# Patient Record
Sex: FEMALE | Race: BLACK OR AFRICAN AMERICAN | NOT HISPANIC OR LATINO | URBAN - METROPOLITAN AREA
[De-identification: names, ages, dates, MRNs, and addresses within clinical notes are randomized per-mention and may not be internally consistent; named-entity substitution may affect disease eponyms.]

---

## 2018-12-29 ENCOUNTER — HOSPITAL ENCOUNTER (EMERGENCY)
Facility: HOSPITAL | Age: 63
Discharge: HOME | End: 2018-12-29
Attending: EMERGENCY MEDICINE
Payer: MEDICARE

## 2018-12-29 VITALS
DIASTOLIC BLOOD PRESSURE: 72 MMHG | TEMPERATURE: 98 F | RESPIRATION RATE: 16 BRPM | OXYGEN SATURATION: 98 % | WEIGHT: 175 LBS | HEART RATE: 84 BPM | HEIGHT: 64 IN | SYSTOLIC BLOOD PRESSURE: 130 MMHG | BODY MASS INDEX: 29.88 KG/M2

## 2018-12-29 DIAGNOSIS — M25.512 PAIN OF BOTH SHOULDER JOINTS: Primary | ICD-10-CM

## 2018-12-29 DIAGNOSIS — M25.511 PAIN OF BOTH SHOULDER JOINTS: Primary | ICD-10-CM

## 2018-12-29 LAB
BILIRUB UR QL STRIP: NEGATIVE
CLARITY UR: CLEAR
COLOR UR: YELLOW
GLUCOSE UR QL STRIP: NEGATIVE
HGB UR QL STRIP: ABNORMAL
KETONES UR QL STRIP: NEGATIVE
LEUKOCYTE ESTERASE UR QL STRIP: NEGATIVE
NITRITE UR QL STRIP: NEGATIVE
PH UR STRIP: 6 [PH] (ref 5–8)
PROT UR QL STRIP: NEGATIVE
SP GR UR STRIP: 1.02 (ref 1–1.03)
URN SPEC COLLECT METH UR: ABNORMAL
UROBILINOGEN UR STRIP-ACNC: NEGATIVE EU/DL

## 2018-12-29 PROCEDURE — 81003 URINALYSIS AUTO W/O SCOPE: CPT

## 2018-12-29 PROCEDURE — 99283 EMERGENCY DEPT VISIT LOW MDM: CPT

## 2018-12-29 RX ORDER — NAPROXEN 500 MG/1
500 TABLET ORAL 2 TIMES DAILY PRN
Qty: 30 TABLET | Refills: 0 | Status: SHIPPED | OUTPATIENT
Start: 2018-12-29

## 2018-12-29 NOTE — ED PROVIDER NOTES
"NAME:  Cathryn Cordero  MRN:    [id number]  ADMIT DATE: 12/29/2018        eMERGENCY dEPARTMENT eNCOUnter    CHIEF COMPLAINT    Chief Complaint   Patient presents with    Shoulder Pain     Patient presents to the ED with reports of having bilateral shoulder/arm pain with back pain that started x 2 weeks ago. Denies any fall or trauma. Treated at home with "tylenol three, eight hundred milligram iburprofen, and aleve" with no relief.     Back Pain       HPI    This is a 63 y.o. female who has no past medical history on file.   The patient presents to the Emergency Department with bilateral shoulder pain x2 weeks.  Patient denies any injury. Pain is constant, achy/throbbing, radiating down both arms, not worsening or improving.    Symptoms are associated with decreased range of motion of both shoulders.  Pt denies numbness/tingling, weakness..   Symptoms are aggravated by movement of the shoulders.  Symptoms are relieved by nothing.  Patient has taken pain medicine at home already.  Patient has no prior history of similar symptoms.           ALLERGIES    Review of patient's allergies indicates:  No Known Allergies      PAST MEDICAL HISTORY  No past medical history on file.      SURGICAL HISTORY    No past surgical history on file.      SOCIAL HISTORY    Social History     Socioeconomic History    Marital status:      Spouse name: Not on file    Number of children: Not on file    Years of education: Not on file    Highest education level: Not on file   Social Needs    Financial resource strain: Not on file    Food insecurity - worry: Not on file    Food insecurity - inability: Not on file    Transportation needs - medical: Not on file    Transportation needs - non-medical: Not on file   Occupational History    Not on file   Tobacco Use    Smoking status: Not on file   Substance and Sexual Activity    Alcohol use: Not on file    Drug use: Not on file    Sexual activity: Not on file   Other Topics " "Concern    Not on file   Social History Narrative    Not on file         FAMILY HISTORY    No family history on file.      REVIEW OF SYSTEMS   ROS  All Systems otherwise negative except as noted in the History of Present Illness.        PHYSICAL EXAM    Reviewed Triage Note    VITAL SIGNS:   ED Triage Vitals [12/29/18 0629]   Enc Vitals Group      /72      Pulse 84      Resp 16      Temp 97.7 °F (36.5 °C)      Temp src Oral      SpO2 98 %      Weight 175 lb      Height 5' 4"      Head Circumference       Peak Flow       Pain Score       Pain Loc       Pain Edu?       Excl. in GC?        Patient Vitals for the past 24 hrs:   BP Temp Temp src Pulse Resp SpO2 Height Weight   12/29/18 0629 130/72 97.7 °F (36.5 °C) Oral 84 16 98 % 5' 4" (1.626 m) 79.4 kg (175 lb)         Physical Exam  Constitutional:  Well-developed, well-nourished.  Patient sitting comfortably in chair.  HENT:  Normocephalic, atraumatic.  Eyes:  Conjunctiva normal without discharge.   Neck: Normal range of motion. No midline tenderness or vertebral step-off. No stridor. No meningismus. No lymphadenopathy.   Respiratory:  Normal breath sounds bilaterally.  No respiratory distress or conversational dyspnea.  Cardiovascular:  Normal heart rate. Normal rhythm. No pitting lower extremity edema.   Musculoskeletal:  No gross deformity.  No palpable bony deformity.  Patient has limitation to 90° of ABduction and flexion of bilateral shoulders.  Normal strength to biceps/triceps, forearm and hands bilaterally. Patient has tenderness to bilateral trapezius, left > right.  Integument:  Warm and dry. No rash.  Neurologic:  Normal motor function. Normal sensory function. No focal deficits noted. Alert and Interactive.  Psychiatric:  Affect normal. Mood normal.         LABS  Pertinent labs reviewed. (See chart for details)   Labs Reviewed   URINALYSIS - Abnormal; Notable for the following components:       Result Value    Occult Blood UA Trace (*)     All " other components within normal limits         RADIOLOGY    Imaging Results    None             PROCEDURES    Procedures        ED COURSE & MEDICAL DECISION MAKING    This is an emergent evaluation of a 63 y.o.female patient with presentation of bilateral shoulder pain, subacute, associated with trapezial spasm with radiation down to elbows but not down to hands.   Initial differentials include but are not limited to:  Shoulder arthritis bilaterally, trapezial spasm, cervical radiculopathy.   Plan:  Naprosyn as needed for pain relief.  The patient has no weakness on exam, no history of trauma. Believe patient is stable discharge without any further imaging.    Clinical impression and plan discussed with patient.    Pt is to call for follow up with PCP in 7 days.  Pt to return to the ED for any new or concerning symptoms.  Aftercare instructions and return precautions provided to patient.   Pt expressed understanding and agrees with plan.               MEDS:  Medications - No data to display        IMPRESSION:  1. Pain of both shoulder joints             DISPOSITION  Patient discharge in stable condition.           DISCLAIMER: This note was prepared with [employer] voice recognition transcription software. Garbled syntax, mangled pronouns, and other bizarre constructions may be attributed to that software system.          Tony Gonzalez MD  12/29/18 1123

## 2018-12-29 NOTE — ED NOTES
"Patient presents to ED secondary to bilateral shoulder pain radiating down bilateral arms stopping at elbows. State has limited ROM and has to have manual help to lift arm. Palpable pulses. Patient also c/o lower back pain. Denies urinary symptoms. States has been having pain x2 weeks. Denies fall or trauma. States "I took tylenol three, ibuprofen, regular tylenol and it didn't help. But I can't lie, my friend gave me a hydrocodone and it took all my pain away." Patient sent to bathroom to get urine specimen. NAD noted.       "

## 2018-12-29 NOTE — DISCHARGE INSTRUCTIONS
You likely have arthritis in your shoulders.  Another possibility is tension in the neck muscles causing a nerve inflammation.  Take medications as needed for pain.    Thank you for choosing [facility]! We appreciate you coming to us for your medical care. We hope you feel better soon! Please come back to [facility] for all of your future medical needs.      Sincerely,    Tony Gonzalez MD  Medical Director  Emergency Department

## 2018-12-29 NOTE — ED NOTES
Assumed care of this patient.     Pt discharged with prescription for Naprosyn at this time. No distress noted.

## 2020-01-07 ENCOUNTER — HOSPITAL ENCOUNTER (OUTPATIENT)
Dept: HOSPITAL 92 - BICMRI | Age: 65
Discharge: HOME | End: 2020-01-07
Attending: ORTHOPAEDIC SURGERY
Payer: MEDICARE

## 2020-01-07 DIAGNOSIS — M75.102: Primary | ICD-10-CM

## 2020-01-07 DIAGNOSIS — M65.812: ICD-10-CM

## 2020-02-04 ENCOUNTER — HOSPITAL ENCOUNTER (OUTPATIENT)
Dept: HOSPITAL 92 - LABBT | Age: 65
Discharge: HOME | End: 2020-02-04
Attending: ORTHOPAEDIC SURGERY
Payer: MEDICARE

## 2020-02-04 DIAGNOSIS — M75.102: ICD-10-CM

## 2020-02-04 DIAGNOSIS — Z01.812: Primary | ICD-10-CM

## 2020-02-04 LAB
HGB BLD-MCNC: 13.8 G/DL (ref 12–16)
MCH RBC QN AUTO: 28.7 PG (ref 27–31)
MCV RBC AUTO: 88.3 FL (ref 78–98)
MDIFF COMPLETE?: YES
PLATELET # BLD AUTO: 183 THOU/UL (ref 130–400)
RBC # BLD AUTO: 4.8 MILL/UL (ref 4.2–5.4)
WBC # BLD AUTO: 7.5 THOU/UL (ref 4.8–10.8)

## 2020-02-04 PROCEDURE — 85025 COMPLETE CBC W/AUTO DIFF WBC: CPT

## 2020-02-06 ENCOUNTER — HOSPITAL ENCOUNTER (OUTPATIENT)
Dept: HOSPITAL 92 - SDC | Age: 65
Discharge: HOME | End: 2020-02-06
Attending: ORTHOPAEDIC SURGERY
Payer: MEDICARE

## 2020-02-06 VITALS — BODY MASS INDEX: 30.2 KG/M2

## 2020-02-06 DIAGNOSIS — F20.9: ICD-10-CM

## 2020-02-06 DIAGNOSIS — M75.122: Primary | ICD-10-CM

## 2020-02-06 DIAGNOSIS — M47.812: ICD-10-CM

## 2020-02-06 DIAGNOSIS — G89.18: ICD-10-CM

## 2020-02-06 DIAGNOSIS — Z79.899: ICD-10-CM

## 2020-02-06 DIAGNOSIS — F41.9: ICD-10-CM

## 2020-02-06 DIAGNOSIS — M75.22: ICD-10-CM

## 2020-02-06 DIAGNOSIS — M19.90: ICD-10-CM

## 2020-02-06 DIAGNOSIS — M19.012: ICD-10-CM

## 2020-02-06 DIAGNOSIS — F31.9: ICD-10-CM

## 2020-02-06 PROCEDURE — 29828 SHO ARTHRS SRG BICP TENODSIS: CPT

## 2020-02-06 PROCEDURE — 0RHK44Z INSERTION OF INTERNAL FIXATION DEVICE INTO LEFT SHOULDER JOINT, PERCUTANEOUS ENDOSCOPIC APPROACH: ICD-10-PCS | Performed by: ORTHOPAEDIC SURGERY

## 2020-02-06 PROCEDURE — 76705 ECHO EXAM OF ABDOMEN: CPT

## 2020-02-06 PROCEDURE — 64416 NJX AA&/STRD BRCH PL NFS IMG: CPT

## 2020-02-06 PROCEDURE — 0LS44ZZ REPOSITION LEFT UPPER ARM TENDON, PERCUTANEOUS ENDOSCOPIC APPROACH: ICD-10-PCS | Performed by: ORTHOPAEDIC SURGERY

## 2020-02-06 PROCEDURE — 0LM24ZZ REATTACHMENT OF LEFT SHOULDER TENDON, PERCUTANEOUS ENDOSCOPIC APPROACH: ICD-10-PCS | Performed by: ORTHOPAEDIC SURGERY

## 2020-02-06 PROCEDURE — 3E0T3BZ INTRODUCTION OF ANESTHETIC AGENT INTO PERIPHERAL NERVES AND PLEXI, PERCUTANEOUS APPROACH: ICD-10-PCS | Performed by: ORTHOPAEDIC SURGERY

## 2020-02-06 PROCEDURE — A4306 DRUG DELIVERY SYSTEM <=50 ML: HCPCS

## 2020-02-06 PROCEDURE — S0020 INJECTION, BUPIVICAINE HYDRO: HCPCS

## 2020-02-06 PROCEDURE — C1713 ANCHOR/SCREW BN/BN,TIS/BN: HCPCS

## 2020-02-06 PROCEDURE — 29827 SHO ARTHRS SRG RT8TR CUF RPR: CPT

## 2020-02-06 PROCEDURE — 97139 UNLISTED THERAPEUTIC PX: CPT

## 2020-06-04 ENCOUNTER — HOSPITAL ENCOUNTER (INPATIENT)
Dept: HOSPITAL 92 - ERS | Age: 65
LOS: 4 days | Discharge: HOME | DRG: 249 | End: 2020-06-08
Attending: INTERNAL MEDICINE | Admitting: INTERNAL MEDICINE
Payer: MEDICARE

## 2020-06-04 VITALS — BODY MASS INDEX: 29 KG/M2

## 2020-06-04 DIAGNOSIS — Z90.710: ICD-10-CM

## 2020-06-04 DIAGNOSIS — I10: ICD-10-CM

## 2020-06-04 DIAGNOSIS — F41.9: ICD-10-CM

## 2020-06-04 DIAGNOSIS — F31.9: ICD-10-CM

## 2020-06-04 DIAGNOSIS — B18.2: ICD-10-CM

## 2020-06-04 DIAGNOSIS — F14.90: ICD-10-CM

## 2020-06-04 DIAGNOSIS — F20.9: ICD-10-CM

## 2020-06-04 DIAGNOSIS — I21.09: Primary | ICD-10-CM

## 2020-06-04 LAB
ALBUMIN SERPL BCG-MCNC: 3.6 G/DL (ref 3.4–4.8)
ALP SERPL-CCNC: 129 U/L (ref 40–110)
ALT SERPL W P-5'-P-CCNC: 11 U/L (ref 8–55)
ANION GAP SERPL CALC-SCNC: 10 MMOL/L (ref 10–20)
AST SERPL-CCNC: 35 U/L (ref 5–34)
BILIRUB SERPL-MCNC: 0.3 MG/DL (ref 0.2–1.2)
BUN SERPL-MCNC: 9 MG/DL (ref 9.8–20.1)
CALCIUM SERPL-MCNC: 8.9 MG/DL (ref 7.8–10.44)
CHLORIDE SERPL-SCNC: 108 MMOL/L (ref 98–107)
CK MB SERPL-MCNC: 34.5 NG/ML (ref 0–6.6)
CK SERPL-CCNC: 445 U/L (ref 29–168)
CO2 SERPL-SCNC: 24 MMOL/L (ref 23–31)
CREAT CL PREDICTED SERPL C-G-VRATE: 0 ML/MIN (ref 70–130)
GLOBULIN SER CALC-MCNC: 3.5 G/DL (ref 2.4–3.5)
GLUCOSE SERPL-MCNC: 122 MG/DL (ref 80–115)
HGB BLD-MCNC: 13.1 G/DL (ref 12–16)
MCH RBC QN AUTO: 29.6 PG (ref 27–31)
MCV RBC AUTO: 88.4 FL (ref 78–98)
MDIFF COMPLETE?: YES
PLATELET # BLD AUTO: 143 THOU/UL (ref 130–400)
POTASSIUM SERPL-SCNC: 3.3 MMOL/L (ref 3.5–5.1)
RBC # BLD AUTO: 4.41 MILL/UL (ref 4.2–5.4)
SODIUM SERPL-SCNC: 139 MMOL/L (ref 136–145)
TROPONIN I SERPL DL<=0.01 NG/ML-MCNC: 3.57 NG/ML (ref ?–0.03)
TROPONIN I SERPL DL<=0.01 NG/ML-MCNC: 7.44 NG/ML (ref ?–0.03)
WBC # BLD AUTO: 6.3 THOU/UL (ref 4.8–10.8)

## 2020-06-04 PROCEDURE — 99152 MOD SED SAME PHYS/QHP 5/>YRS: CPT

## 2020-06-04 PROCEDURE — 80053 COMPREHEN METABOLIC PANEL: CPT

## 2020-06-04 PROCEDURE — 71045 X-RAY EXAM CHEST 1 VIEW: CPT

## 2020-06-04 PROCEDURE — C1876 STENT, NON-COA/NON-COV W/DEL: HCPCS

## 2020-06-04 PROCEDURE — 80061 LIPID PANEL: CPT

## 2020-06-04 PROCEDURE — 82550 ASSAY OF CK (CPK): CPT

## 2020-06-04 PROCEDURE — 93306 TTE W/DOPPLER COMPLETE: CPT

## 2020-06-04 PROCEDURE — 93458 L HRT ARTERY/VENTRICLE ANGIO: CPT

## 2020-06-04 PROCEDURE — 85347 COAGULATION TIME ACTIVATED: CPT

## 2020-06-04 PROCEDURE — 85025 COMPLETE CBC W/AUTO DIFF WBC: CPT

## 2020-06-04 PROCEDURE — B2111ZZ FLUOROSCOPY OF MULTIPLE CORONARY ARTERIES USING LOW OSMOLAR CONTRAST: ICD-10-PCS | Performed by: INTERNAL MEDICINE

## 2020-06-04 PROCEDURE — 96374 THER/PROPH/DIAG INJ IV PUSH: CPT

## 2020-06-04 PROCEDURE — 93005 ELECTROCARDIOGRAM TRACING: CPT

## 2020-06-04 PROCEDURE — 84484 ASSAY OF TROPONIN QUANT: CPT

## 2020-06-04 PROCEDURE — 99153 MOD SED SAME PHYS/QHP EA: CPT

## 2020-06-04 PROCEDURE — 36415 COLL VENOUS BLD VENIPUNCTURE: CPT

## 2020-06-04 PROCEDURE — 92941 PRQ TRLML REVSC TOT OCCL AMI: CPT

## 2020-06-04 PROCEDURE — 4A023N7 MEASUREMENT OF CARDIAC SAMPLING AND PRESSURE, LEFT HEART, PERCUTANEOUS APPROACH: ICD-10-PCS | Performed by: INTERNAL MEDICINE

## 2020-06-04 PROCEDURE — B2151ZZ FLUOROSCOPY OF LEFT HEART USING LOW OSMOLAR CONTRAST: ICD-10-PCS | Performed by: INTERNAL MEDICINE

## 2020-06-04 PROCEDURE — 96375 TX/PRO/DX INJ NEW DRUG ADDON: CPT

## 2020-06-04 PROCEDURE — 82553 CREATINE MB FRACTION: CPT

## 2020-06-04 PROCEDURE — 93010 ELECTROCARDIOGRAM REPORT: CPT

## 2020-06-04 PROCEDURE — 93798 PHYS/QHP OP CAR RHAB W/ECG: CPT

## 2020-06-04 PROCEDURE — 02703DZ DILATION OF CORONARY ARTERY, ONE ARTERY WITH INTRALUMINAL DEVICE, PERCUTANEOUS APPROACH: ICD-10-PCS | Performed by: INTERNAL MEDICINE

## 2020-06-04 RX ADMIN — Medication SCH ML: at 21:20

## 2020-06-04 RX ADMIN — ASPIRIN 81 MG CHEWABLE TABLET SCH MG: 81 TABLET CHEWABLE at 09:42

## 2020-06-04 RX ADMIN — TICAGRELOR SCH MG: 90 TABLET ORAL at 09:42

## 2020-06-04 RX ADMIN — ACETAMINOPHEN AND CODEINE PHOSPHATE PRN TAB: 300; 30 TABLET ORAL at 12:20

## 2020-06-04 RX ADMIN — TICAGRELOR SCH MG: 90 TABLET ORAL at 21:20

## 2020-06-04 RX ADMIN — Medication SCH ML: at 09:43

## 2020-06-04 RX ADMIN — ACETAMINOPHEN AND CODEINE PHOSPHATE PRN TAB: 300; 30 TABLET ORAL at 21:19

## 2020-06-04 RX ADMIN — ACETAMINOPHEN AND CODEINE PHOSPHATE PRN TAB: 300; 30 TABLET ORAL at 16:08

## 2020-06-05 LAB
ALBUMIN SERPL BCG-MCNC: 3.1 G/DL (ref 3.4–4.8)
ALP SERPL-CCNC: 86 U/L (ref 40–110)
ALT SERPL W P-5'-P-CCNC: 11 U/L (ref 8–55)
ANION GAP SERPL CALC-SCNC: 11 MMOL/L (ref 10–20)
AST SERPL-CCNC: 32 U/L (ref 5–34)
BASOPHILS # BLD AUTO: 0.1 THOU/UL (ref 0–0.2)
BASOPHILS NFR BLD AUTO: 0.9 % (ref 0–1)
BILIRUB SERPL-MCNC: 0.4 MG/DL (ref 0.2–1.2)
BUN SERPL-MCNC: 8 MG/DL (ref 9.8–20.1)
CALCIUM SERPL-MCNC: 8.6 MG/DL (ref 7.8–10.44)
CHD RISK SERPL-RTO: 4.1 (ref ?–4.5)
CHLORIDE SERPL-SCNC: 109 MMOL/L (ref 98–107)
CHOLEST SERPL-MCNC: 163 MG/DL
CO2 SERPL-SCNC: 22 MMOL/L (ref 23–31)
CREAT CL PREDICTED SERPL C-G-VRATE: 99 ML/MIN (ref 70–130)
EOSINOPHIL # BLD AUTO: 0.1 THOU/UL (ref 0–0.7)
EOSINOPHIL NFR BLD AUTO: 1.3 % (ref 0–10)
GLOBULIN SER CALC-MCNC: 3.1 G/DL (ref 2.4–3.5)
GLUCOSE SERPL-MCNC: 94 MG/DL (ref 80–115)
HDLC SERPL-MCNC: 40 MG/DL
HGB BLD-MCNC: 12 G/DL (ref 12–16)
LDLC SERPL CALC-MCNC: 91 MG/DL
LYMPHOCYTES # BLD: 2.7 THOU/UL (ref 1.2–3.4)
LYMPHOCYTES NFR BLD AUTO: 43.4 % (ref 21–51)
MCH RBC QN AUTO: 30.1 PG (ref 27–31)
MCV RBC AUTO: 88.1 FL (ref 78–98)
MONOCYTES # BLD AUTO: 0.4 THOU/UL (ref 0.11–0.59)
MONOCYTES NFR BLD AUTO: 7 % (ref 0–10)
NEUTROPHILS # BLD AUTO: 3 THOU/UL (ref 1.4–6.5)
NEUTROPHILS NFR BLD AUTO: 47.4 % (ref 42–75)
PLATELET # BLD AUTO: 132 THOU/UL (ref 130–400)
POTASSIUM SERPL-SCNC: 3.7 MMOL/L (ref 3.5–5.1)
RBC # BLD AUTO: 3.99 MILL/UL (ref 4.2–5.4)
SODIUM SERPL-SCNC: 138 MMOL/L (ref 136–145)
TRIGL SERPL-MCNC: 160 MG/DL (ref ?–150)
WBC # BLD AUTO: 6.2 THOU/UL (ref 4.8–10.8)

## 2020-06-05 RX ADMIN — Medication SCH ML: at 08:49

## 2020-06-05 RX ADMIN — TICAGRELOR SCH MG: 90 TABLET ORAL at 20:31

## 2020-06-05 RX ADMIN — Medication SCH ML: at 20:31

## 2020-06-05 RX ADMIN — ASPIRIN 81 MG CHEWABLE TABLET SCH MG: 81 TABLET CHEWABLE at 08:48

## 2020-06-05 RX ADMIN — TICAGRELOR SCH MG: 90 TABLET ORAL at 08:48

## 2020-06-06 RX ADMIN — Medication SCH ML: at 08:36

## 2020-06-06 RX ADMIN — TICAGRELOR SCH MG: 90 TABLET ORAL at 20:56

## 2020-06-06 RX ADMIN — TICAGRELOR SCH MG: 90 TABLET ORAL at 08:35

## 2020-06-06 RX ADMIN — Medication SCH ML: at 20:57

## 2020-06-06 RX ADMIN — ASPIRIN 81 MG CHEWABLE TABLET SCH MG: 81 TABLET CHEWABLE at 08:35

## 2020-06-07 RX ADMIN — Medication SCH ML: at 09:03

## 2020-06-07 RX ADMIN — Medication SCH ML: at 20:35

## 2020-06-07 RX ADMIN — TICAGRELOR SCH MG: 90 TABLET ORAL at 09:02

## 2020-06-07 RX ADMIN — TICAGRELOR SCH MG: 90 TABLET ORAL at 20:36

## 2020-06-07 RX ADMIN — ASPIRIN 81 MG CHEWABLE TABLET SCH MG: 81 TABLET CHEWABLE at 09:02

## 2020-06-08 VITALS — DIASTOLIC BLOOD PRESSURE: 82 MMHG | SYSTOLIC BLOOD PRESSURE: 116 MMHG

## 2020-06-08 VITALS — TEMPERATURE: 98.3 F

## 2020-06-08 RX ADMIN — Medication SCH ML: at 09:08

## 2020-06-08 RX ADMIN — ASPIRIN 81 MG CHEWABLE TABLET SCH MG: 81 TABLET CHEWABLE at 09:03

## 2020-06-08 RX ADMIN — TICAGRELOR SCH MG: 90 TABLET ORAL at 09:07

## 2021-10-27 ENCOUNTER — HOSPITAL ENCOUNTER (EMERGENCY)
Dept: HOSPITAL 92 - ERS | Age: 66
Discharge: HOME | End: 2021-10-27
Payer: COMMERCIAL

## 2021-10-27 DIAGNOSIS — M25.512: ICD-10-CM
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
## 2021-10-27 DIAGNOSIS — Z95.5: ICD-10-CM

## 2021-10-27 DIAGNOSIS — E78.5: ICD-10-CM

## 2021-10-27 DIAGNOSIS — M62.830: Primary | ICD-10-CM

## 2021-10-27 DIAGNOSIS — Z87.19: ICD-10-CM

## 2021-10-27 DIAGNOSIS — I10: ICD-10-CM

## 2021-10-27 DIAGNOSIS — M06.9: ICD-10-CM

## 2021-10-27 DIAGNOSIS — R00.1: ICD-10-CM

## 2021-10-27 LAB
ALBUMIN SERPL BCG-MCNC: 4.1 G/DL (ref 3.4–4.8)
ALP SERPL-CCNC: 86 U/L (ref 40–110)
ALT SERPL W P-5'-P-CCNC: 11 U/L (ref 8–55)
ANION GAP SERPL CALC-SCNC: 13 MMOL/L (ref 10–20)
AST SERPL-CCNC: 16 U/L (ref 5–34)
BILIRUB SERPL-MCNC: 0.3 MG/DL (ref 0.2–1.2)
BUN SERPL-MCNC: 17 MG/DL (ref 9.8–20.1)
CALCIUM SERPL-MCNC: 9.7 MG/DL (ref 7.8–10.44)
CHLORIDE SERPL-SCNC: 107 MMOL/L (ref 98–107)
CO2 SERPL-SCNC: 23 MMOL/L (ref 23–31)
CREAT CL PREDICTED SERPL C-G-VRATE: 0 ML/MIN (ref 70–130)
GLOBULIN SER CALC-MCNC: 3.6 G/DL (ref 2.4–3.5)
GLUCOSE SERPL-MCNC: 91 MG/DL (ref 80–115)
HGB BLD-MCNC: 13.1 G/DL (ref 12–16)
MCH RBC QN AUTO: 30.5 PG (ref 27–31)
MCV RBC AUTO: 92.2 FL (ref 78–98)
MDIFF COMPLETE?: YES
PLATELET # BLD AUTO: 135 THOU/UL (ref 130–400)
POLYCHROMASIA BLD QL SMEAR: (no result) (100X)
POTASSIUM SERPL-SCNC: 4.3 MMOL/L (ref 3.5–5.1)
RBC # BLD AUTO: 4.3 MILL/UL (ref 4.2–5.4)
SODIUM SERPL-SCNC: 139 MMOL/L (ref 136–145)
WBC # BLD AUTO: 5.8 THOU/UL (ref 4.8–10.8)

## 2021-10-27 PROCEDURE — 85025 COMPLETE CBC W/AUTO DIFF WBC: CPT

## 2021-10-27 PROCEDURE — 84484 ASSAY OF TROPONIN QUANT: CPT

## 2021-10-27 PROCEDURE — 36415 COLL VENOUS BLD VENIPUNCTURE: CPT

## 2021-10-27 PROCEDURE — 93005 ELECTROCARDIOGRAM TRACING: CPT

## 2021-10-27 PROCEDURE — 71045 X-RAY EXAM CHEST 1 VIEW: CPT

## 2021-10-27 PROCEDURE — 80053 COMPREHEN METABOLIC PANEL: CPT

## 2023-02-15 ENCOUNTER — HOSPITAL ENCOUNTER (OUTPATIENT)
Dept: HOSPITAL 92 - CSHMRI | Age: 68
Discharge: HOME | End: 2023-02-15
Attending: INTERNAL MEDICINE
Payer: COMMERCIAL

## 2023-02-15 DIAGNOSIS — M47.816: ICD-10-CM

## 2023-02-15 DIAGNOSIS — M54.41: Primary | ICD-10-CM

## 2023-02-15 DIAGNOSIS — M48.061: ICD-10-CM

## 2023-02-15 PROCEDURE — 72148 MRI LUMBAR SPINE W/O DYE: CPT

## 2023-03-16 ENCOUNTER — HOSPITAL ENCOUNTER (EMERGENCY)
Dept: HOSPITAL 92 - ERS | Age: 68
Discharge: HOME | End: 2023-03-16
Payer: COMMERCIAL

## 2023-03-16 DIAGNOSIS — H60.502: ICD-10-CM

## 2023-03-16 DIAGNOSIS — H65.02: Primary | ICD-10-CM

## 2023-03-16 DIAGNOSIS — I10: ICD-10-CM

## 2023-03-16 DIAGNOSIS — E78.5: ICD-10-CM

## 2023-03-16 DIAGNOSIS — I25.2: ICD-10-CM

## 2023-03-16 DIAGNOSIS — M06.9: ICD-10-CM

## 2023-03-16 PROCEDURE — 99282 EMERGENCY DEPT VISIT SF MDM: CPT
